# Patient Record
Sex: FEMALE | Race: WHITE | NOT HISPANIC OR LATINO | URBAN - METROPOLITAN AREA
[De-identification: names, ages, dates, MRNs, and addresses within clinical notes are randomized per-mention and may not be internally consistent; named-entity substitution may affect disease eponyms.]

---

## 2019-11-24 ASSESSMENT — MIFFLIN-ST. JEOR
SCORE: 1170.75
SCORE: 1164.63

## 2019-11-25 ENCOUNTER — ANESTHESIA - HEALTHEAST (OUTPATIENT)
Dept: MEDSURG UNIT | Facility: CLINIC | Age: 84
End: 2019-11-25

## 2019-11-25 ENCOUNTER — SURGERY - HEALTHEAST (OUTPATIENT)
Dept: SURGERY | Facility: CLINIC | Age: 84
End: 2019-11-25

## 2021-05-09 ENCOUNTER — COMMUNICATION - HEALTHEAST (OUTPATIENT)
Dept: CARE COORDINATION | Facility: CLINIC | Age: 86
End: 2021-05-09

## 2021-06-04 VITALS — WEIGHT: 168.1 LBS | HEIGHT: 62 IN | BODY MASS INDEX: 30.93 KG/M2

## 2021-06-16 PROBLEM — N17.9 ACUTE KIDNEY INJURY (H): Status: ACTIVE | Noted: 2021-05-08

## 2021-06-16 PROBLEM — J12.82 PNEUMONIA DUE TO COVID-19 VIRUS: Status: ACTIVE | Noted: 2021-05-04

## 2021-06-16 PROBLEM — U07.1 PNEUMONIA DUE TO COVID-19 VIRUS: Status: ACTIVE | Noted: 2021-05-04

## 2021-06-16 PROBLEM — M65.90 TENOSYNOVITIS: Status: ACTIVE | Noted: 2019-11-25

## 2021-06-16 PROBLEM — M10.042 ACUTE IDIOPATHIC GOUT OF LEFT HAND: Status: ACTIVE | Noted: 2019-11-26

## 2021-06-16 PROBLEM — E87.1 HYPONATREMIA: Status: ACTIVE | Noted: 2021-05-08

## 2021-06-16 PROBLEM — J96.01 ACUTE RESPIRATORY FAILURE WITH HYPOXIA (H): Status: ACTIVE | Noted: 2021-05-08

## 2021-06-16 PROBLEM — M65.949 FLEXOR TENOSYNOVITIS OF FINGER: Status: ACTIVE | Noted: 2019-11-24

## 2021-06-16 PROBLEM — L03.012 PARONYCHIA OF FINGER, LEFT: Status: ACTIVE | Noted: 2019-11-25

## 2021-06-17 NOTE — PROGRESS NOTES
"[facility]/ED Discharge Follow up Note    Hospital/ED Discharge date: 5/8/21    Reason/Diagnosis for Hospital/ED visit: COVID PNA    Do you feel your health and symptoms have improved, worsened, or remained the same since your Hospital/ED visit? Improved. Pt states she feels \"well\". No dyspnea noted with speech. Son is helping her.     Were you sent home with oxygen or a pulse oximeter? Yes: both     Are you currently utilizing the pulse oximeter and/or oxygen?  Yes: both, however, she hasn't been using O2 with amb. Because portable concentrator is wieldy.  If utilizing pulse oximeter and/or oxygen, RN verified patient has understanding of use.    What liter flow were you instructed to use? 5L w/ activity  What liter flow are you using to maintain your oxygen saturation: 5L  What type of home oxygen system do you have (*ask about portability and ability to manage a portable oxygen delivery)?  She has a concentrator and portable concentrator  Who is your supply company? [facility] [place]  What are your current home oxygen saturation readings? 88-90%w/o O2. Instructed pt to use stationary concentrator when walking around to keep O2 sats >90%. She states she will try it.      Were you prescribed any new medications? Yes Eliquis. She is taking it two times a day. Pt correctly reported she will start ASA 6/9.  If yes, have you picked up these new medications? Yes      Do you have any questions about the new medications? No     Have you had to reduce your activities because of shortness of breath or other symptoms? Yes: oxygen has been interfering with amb.     Follow up:    Do you have any follow up appointments scheduled with your PCP or a specialist? No Pt wants to schedule with her [facility] provider.    Do you feel like you have a plan in place in the event of an emergency? Yes    Confirmed patient has been sent .[id number] via [employer] or e-mail and reviewed any questions patient may " have:  No      RN Notes:  Asked pt to accept invitation to participate in [facility].  Stressed importance of using O2 with activity.